# Patient Record
Sex: FEMALE | Race: WHITE | URBAN - METROPOLITAN AREA
[De-identification: names, ages, dates, MRNs, and addresses within clinical notes are randomized per-mention and may not be internally consistent; named-entity substitution may affect disease eponyms.]

---

## 2021-11-09 ENCOUNTER — WEB ENCOUNTER (OUTPATIENT)
Dept: URBAN - METROPOLITAN AREA CLINIC 19 | Facility: CLINIC | Age: 86
End: 2021-11-09

## 2021-11-09 ENCOUNTER — DASHBOARD ENCOUNTERS (OUTPATIENT)
Age: 86
End: 2021-11-09

## 2021-11-09 ENCOUNTER — LAB OUTSIDE AN ENCOUNTER (OUTPATIENT)
Dept: URBAN - METROPOLITAN AREA CLINIC 19 | Facility: CLINIC | Age: 86
End: 2021-11-09

## 2021-11-09 ENCOUNTER — OFFICE VISIT (OUTPATIENT)
Dept: URBAN - METROPOLITAN AREA CLINIC 19 | Facility: CLINIC | Age: 86
End: 2021-11-09
Payer: MEDICARE

## 2021-11-09 VITALS
HEIGHT: 63 IN | BODY MASS INDEX: 20.73 KG/M2 | TEMPERATURE: 98.2 F | DIASTOLIC BLOOD PRESSURE: 74 MMHG | SYSTOLIC BLOOD PRESSURE: 112 MMHG | WEIGHT: 117 LBS

## 2021-11-09 DIAGNOSIS — Z80.0 FAMILY HISTORY OF COLON CANCER IN MOTHER: ICD-10-CM

## 2021-11-09 DIAGNOSIS — K52.9 COLITIS: ICD-10-CM

## 2021-11-09 DIAGNOSIS — R63.4 WEIGHT LOSS: ICD-10-CM

## 2021-11-09 DIAGNOSIS — K92.1 MELENA: ICD-10-CM

## 2021-11-09 PROBLEM — 2901004: Status: ACTIVE | Noted: 2021-11-09

## 2021-11-09 PROBLEM — 312824007: Status: ACTIVE | Noted: 2021-11-09

## 2021-11-09 PROBLEM — 89362005: Status: ACTIVE | Noted: 2021-11-09

## 2021-11-09 PROCEDURE — 99204 OFFICE O/P NEW MOD 45 MIN: CPT | Performed by: NURSE PRACTITIONER

## 2021-11-09 RX ORDER — GABAPENTIN 300 MG/1
1 CAPSULE CAPSULE ORAL ONCE A DAY
Status: ACTIVE | COMMUNITY

## 2021-11-09 RX ORDER — CARVEDILOL 12.5 MG/1
1 TABLET WITH FOOD TABLET, FILM COATED ORAL TWICE A DAY
Status: ACTIVE | COMMUNITY

## 2021-11-09 RX ORDER — KRILL/OM-3/DHA/EPA/PHOSPHO/AST 1000-230MG
1 TABLET CAPSULE ORAL ONCE A DAY
Status: ACTIVE | COMMUNITY

## 2021-11-09 RX ORDER — POLYETHYLENE GLYCOL 3350, SODIUM CHLORIDE, SODIUM BICARBONATE, POTASSIUM CHLORIDE 420; 11.2; 5.72; 1.48 G/4L; G/4L; G/4L; G/4L
AS DIRECTED POWDER, FOR SOLUTION ORAL ONCE
Qty: 1 | Refills: 0 | OUTPATIENT
Start: 2021-11-09 | End: 2021-11-10

## 2021-11-09 RX ORDER — ONDANSETRON 4 MG/1
1 TABLET ON THE TONGUE AND ALLOW TO DISSOLVE TABLET, ORALLY DISINTEGRATING ORAL
Qty: 6 | Refills: 0 | OUTPATIENT
Start: 2021-11-09

## 2021-11-09 NOTE — HPI-TODAY'S VISIT:
Ms. Hanks is an 88-year-old female who presents today following up from her recent hospital admission for colitis at [facility] from 10/16/2021 to 10/18/2021.  She presented with bloody diarrhea x3 days.  On recent antibiotics for left wrist surgery.  Was on Eliquis.  Labs-WBC 6.6, Hgb 15.0, HCT 46.2, MCV 96.9, PT 14.8, INR 1.2, creatinine 0.7, LFTs normal.   CT ab/pelvis-findings of mild distal colitis involving the distal sigmoid colon and rectum.  She was admitted for monitoring.  On liquid diet and symptoms improved.  Today, she presents with her daughter for follow-up.  She reports over the last 3 to 4 days she has been having loose black stools 3-4 times a day.  She is currently off Eliquis but on aspirin.  Not eating any fiber.  Eating a lot of probiotic yogurt.  No bright red blood in stool.  No abdominal pain.  No stool studies done at hospital.  Reports she is lost 14 pounds in the last 2 weeks.  Last colonoscopy was 15 years ago.  Reports her mother had colon cancer.  Has never had an EGD.  No heartburn.  She reports she has CHF and A. fib and sees cardiologist Dr. Morgan Cole in [place].  She denies kidney disease, diabetes, and home O2.  She is in a wheelchair.

## 2021-11-10 ENCOUNTER — LAB OUTSIDE AN ENCOUNTER (OUTPATIENT)
Dept: URBAN - METROPOLITAN AREA CLINIC 19 | Facility: CLINIC | Age: 86
End: 2021-11-10

## 2021-11-10 LAB
FERRITIN, SERUM: 109
HEMATOCRIT: 43.8
HEMOGLOBIN: 14.8
IRON BIND.CAP.(TIBC): 365
IRON SATURATION: 33
IRON: 119
MCH: 31.9
MCHC: 33.8
MCV: 94
NRBC: (no result)
PLATELETS: 205
RBC: 4.64
RDW: 13.4
UIBC: 246
WBC: 4.7

## 2021-11-16 ENCOUNTER — TELEPHONE ENCOUNTER (OUTPATIENT)
Dept: URBAN - METROPOLITAN AREA CLINIC 19 | Facility: CLINIC | Age: 86
End: 2021-11-16

## 2021-11-16 PROBLEM — 5891000119102: Status: ACTIVE | Noted: 2021-11-16

## 2021-11-16 LAB
C. DIFFICILE TOXIN A/B, STOOL - QDX: NEGATIVE
GASTROINTESTINAL PATHOGEN: (no result)

## 2021-11-16 RX ORDER — VANCOMYCIN HYDROCHLORIDE 125 MG/1
AS DIRECTED CAPSULE ORAL EVERY 6 HOURS
Qty: 40 | Refills: 0 | OUTPATIENT
Start: 2021-11-16 | End: 2021-11-26

## 2021-11-16 RX ORDER — ONDANSETRON 4 MG/1
1 TABLET ON THE TONGUE AND ALLOW TO DISSOLVE TABLET, ORALLY DISINTEGRATING ORAL
Qty: 6 | Refills: 0 | Status: ACTIVE | COMMUNITY
Start: 2021-11-09

## 2021-11-16 RX ORDER — CARVEDILOL 12.5 MG/1
1 TABLET WITH FOOD TABLET, FILM COATED ORAL TWICE A DAY
Status: ACTIVE | COMMUNITY

## 2021-11-16 RX ORDER — KRILL/OM-3/DHA/EPA/PHOSPHO/AST 1000-230MG
1 TABLET CAPSULE ORAL ONCE A DAY
Status: ACTIVE | COMMUNITY

## 2021-11-16 RX ORDER — GABAPENTIN 300 MG/1
1 CAPSULE CAPSULE ORAL ONCE A DAY
Status: ACTIVE | COMMUNITY

## 2021-12-07 ENCOUNTER — TELEPHONE ENCOUNTER (OUTPATIENT)
Dept: URBAN - METROPOLITAN AREA CLINIC 19 | Facility: CLINIC | Age: 86
End: 2021-12-07

## 2021-12-07 PROBLEM — 64226004: Status: ACTIVE | Noted: 2021-11-09

## 2023-07-31 ENCOUNTER — OFFICE VISIT (OUTPATIENT)
Dept: URBAN - METROPOLITAN AREA CLINIC 128 | Facility: CLINIC | Age: 88
End: 2023-07-31

## 2023-08-23 ENCOUNTER — OFFICE VISIT (OUTPATIENT)
Dept: URBAN - METROPOLITAN AREA CLINIC 19 | Facility: CLINIC | Age: 88
End: 2023-08-23